# Patient Record
Sex: FEMALE
[De-identification: names, ages, dates, MRNs, and addresses within clinical notes are randomized per-mention and may not be internally consistent; named-entity substitution may affect disease eponyms.]

---

## 2019-12-10 ENCOUNTER — APPOINTMENT (OUTPATIENT)
Dept: SURGICAL ONCOLOGY | Facility: CLINIC | Age: 59
End: 2019-12-10
Payer: COMMERCIAL

## 2019-12-10 VITALS
RESPIRATION RATE: 16 BRPM | BODY MASS INDEX: 25.68 KG/M2 | DIASTOLIC BLOOD PRESSURE: 72 MMHG | OXYGEN SATURATION: 98 % | HEART RATE: 66 BPM | WEIGHT: 136 LBS | SYSTOLIC BLOOD PRESSURE: 120 MMHG | HEIGHT: 61 IN

## 2019-12-10 DIAGNOSIS — N63.10 UNSPECIFIED LUMP IN THE RIGHT BREAST, UNSPECIFIED QUADRANT: ICD-10-CM

## 2019-12-10 DIAGNOSIS — Z80.3 FAMILY HISTORY OF MALIGNANT NEOPLASM OF BREAST: ICD-10-CM

## 2019-12-10 DIAGNOSIS — Z86.69 PERSONAL HISTORY OF OTHER DISEASES OF THE NERVOUS SYSTEM AND SENSE ORGANS: ICD-10-CM

## 2019-12-10 DIAGNOSIS — Z80.0 FAMILY HISTORY OF MALIGNANT NEOPLASM OF DIGESTIVE ORGANS: ICD-10-CM

## 2019-12-10 PROBLEM — Z00.00 ENCOUNTER FOR PREVENTIVE HEALTH EXAMINATION: Status: ACTIVE | Noted: 2019-12-10

## 2019-12-10 PROCEDURE — 99243 OFF/OP CNSLTJ NEW/EST LOW 30: CPT

## 2019-12-10 NOTE — ADDENDUM
[FreeTextEntry1] : I, Christopher Rosado, acted soley as a scribe for Dr. Cliff Ty on 12/10/2019\par

## 2019-12-10 NOTE — CONSULT LETTER
[Dear  ___] : Dear  [unfilled], [Please see my note below.] : Please see my note below. [Consult Letter:] : I had the pleasure of evaluating your patient, [unfilled]. [Sincerely,] : Sincerely, [Consult Closing:] : Thank you very much for allowing me to participate in the care of this patient.  If you have any questions, please do not hesitate to contact me. [FreeTextEntry2] : [address] \[place] [id number] [FreeTextEntry3] : Cliff Ty M.D.\par \par \par

## 2019-12-10 NOTE — HISTORY OF PRESENT ILLNESS
[de-identified] : 58 y/o female presents for an initial consultation regarding a right breast mass which she has had for one year. \par \par MMG/US (19):\par No mammographic or sonographic evidence of malignancy. Specifically no abnormality identified corresponding to the region of palpable concern in the RT breast. Follow-up imaging in 3 months. \par BI-RADS 3: Probably benign \par \par Today, on 12/10/19, the pt c/o of pain in her RT breast pain.  Denies nipple discharge, skin changes, inversion of breast pain. Denies constitutional symptoms\par \par Menarche: 10\par LNMP: 50\par Age at first pregnancy: 31\par A0\par FHx of breast cancer seen in her sister (dx in her 30s)\par FHx of stomach cancer seen in her brother

## 2019-12-10 NOTE — ASSESSMENT
[FreeTextEntry1] : Imp:\par Normal breast examination \par \par \par Plan: \par Scheduled US in March/April 2020 to follow-up on palpable area of concern

## 2019-12-10 NOTE — PHYSICAL EXAM
[Normal] : supple, no neck mass and thyroid not enlarged [Normal Neck Lymph Nodes] : normal neck lymph nodes  [Normal Groin Lymph Nodes] : normal groin lymph nodes [Normal] : oriented to person, place and time, with appropriate affect [FreeTextEntry1] : I, Christopher Rosado, was present for the physical exam.\par  [de-identified] : Complete normal breast examination performed supine and upright revealed no palpable masses, nipple discharge, inversion, deviation, or enlarge axillary lymph nodes, or supraclavicular lymph nodes. [de-identified] : Normal S1,S2. Regular rate and rhythm [de-identified] : Clear breath sounds bilaterally, normal respiratory effort\par \par

## 2020-04-07 ENCOUNTER — APPOINTMENT (OUTPATIENT)
Dept: SURGICAL ONCOLOGY | Facility: CLINIC | Age: 60
End: 2020-04-07